# Patient Record
Sex: FEMALE | Race: WHITE | URBAN - METROPOLITAN AREA
[De-identification: names, ages, dates, MRNs, and addresses within clinical notes are randomized per-mention and may not be internally consistent; named-entity substitution may affect disease eponyms.]

---

## 2022-04-27 ENCOUNTER — OFFICE VISIT (OUTPATIENT)
Dept: URBAN - METROPOLITAN AREA SURGERY CENTER 31 | Facility: SURGERY CENTER | Age: 52
End: 2022-04-27
Payer: COMMERCIAL

## 2022-04-27 DIAGNOSIS — Z12.11 COLON CANCER SCREENING: ICD-10-CM

## 2022-04-27 PROCEDURE — G8907 PT DOC NO EVENTS ON DISCHARG: HCPCS | Performed by: STUDENT IN AN ORGANIZED HEALTH CARE EDUCATION/TRAINING PROGRAM

## 2022-04-27 PROCEDURE — G0121 COLON CA SCRN NOT HI RSK IND: HCPCS | Performed by: STUDENT IN AN ORGANIZED HEALTH CARE EDUCATION/TRAINING PROGRAM

## 2022-04-27 PROCEDURE — 45378 DIAGNOSTIC COLONOSCOPY: CPT | Performed by: STUDENT IN AN ORGANIZED HEALTH CARE EDUCATION/TRAINING PROGRAM

## 2023-04-26 ENCOUNTER — OFFICE VISIT (OUTPATIENT)
Dept: URBAN - METROPOLITAN AREA CLINIC 19 | Facility: CLINIC | Age: 53
End: 2023-04-26

## 2023-05-31 ENCOUNTER — DASHBOARD ENCOUNTERS (OUTPATIENT)
Age: 53
End: 2023-05-31

## 2023-05-31 ENCOUNTER — OFFICE VISIT (OUTPATIENT)
Dept: URBAN - METROPOLITAN AREA CLINIC 19 | Facility: CLINIC | Age: 53
End: 2023-05-31
Payer: COMMERCIAL

## 2023-05-31 ENCOUNTER — LAB OUTSIDE AN ENCOUNTER (OUTPATIENT)
Dept: URBAN - METROPOLITAN AREA CLINIC 19 | Facility: CLINIC | Age: 53
End: 2023-05-31

## 2023-05-31 ENCOUNTER — WEB ENCOUNTER (OUTPATIENT)
Dept: URBAN - METROPOLITAN AREA CLINIC 19 | Facility: CLINIC | Age: 53
End: 2023-05-31

## 2023-05-31 VITALS
SYSTOLIC BLOOD PRESSURE: 122 MMHG | DIASTOLIC BLOOD PRESSURE: 84 MMHG | HEIGHT: 64 IN | TEMPERATURE: 98.3 F | BODY MASS INDEX: 26.56 KG/M2 | WEIGHT: 155.6 LBS

## 2023-05-31 DIAGNOSIS — R74.8 ELEVATED ALKALINE PHOSPHATASE LEVEL: ICD-10-CM

## 2023-05-31 PROCEDURE — 99203 OFFICE O/P NEW LOW 30 MIN: CPT | Performed by: INTERNAL MEDICINE

## 2023-05-31 PROCEDURE — 99243 OFF/OP CNSLTJ NEW/EST LOW 30: CPT | Performed by: INTERNAL MEDICINE

## 2023-05-31 NOTE — HPI-TODAY'S VISIT:
Patient presents as a referral from Dr. Jemima Moraes for evaluation of elevated alkaline phsophatase. A copy of this note will be sent to the referring physician.  The patient has been getting routine labs through her PCP, and it was noted that her alkaline phosphatase has been elevated (less than 2X ULN). Isoenzymes have been normal - it is not clear what the source of the AP is. She has no symptoms except a "burning tongue" - her ENT cannot explain it. No pruritus, fatigue, or jaundice. The rest of her LFTs are normal. She had a screening colonoscopy last year - it was reportedly normal except for diverticulosis.

## 2023-06-07 LAB
A/G RATIO: 2.1
ACTIN (SMOOTH MUSCLE) ANTIBODY: 20
ALBUMIN: 4.4
ALKALINE PHOSPHATASE: 176
ALT (SGPT): 18
ANA DIRECT: NEGATIVE
AST (SGOT): 17
BILIRUBIN, TOTAL: 0.8
BUN/CREATININE RATIO: 19
BUN: 12
CALCIUM: 9.7
CARBON DIOXIDE, TOTAL: 21
CHLORIDE: 106
CREATININE: 0.64
EGFR: 105
GGT: 14
GLOBULIN, TOTAL: 2.1
GLUCOSE: 94
HEMATOCRIT: 48.4
HEMATOLOGY COMMENTS:: (no result)
HEMOGLOBIN: 16.4
IGG, SUBCLASS 4: 10
IMMUNOGLOBULIN G, QN, SERUM: 1022
LIVER-KIDNEY MICROSOMAL AB: 1.7
Lab: (no result)
MCH: 29.7
MCHC: 33.9
MCV: 88
MITOCHONDRIAL (M2) ANTIBODY: <20
NRBC: (no result)
PLATELETS: 128
POTASSIUM: 3.6
PROTEIN, TOTAL: 6.5
RBC: 5.52
RDW: 13
SODIUM: 144
WBC: 5.1

## 2023-06-15 ENCOUNTER — LAB OUTSIDE AN ENCOUNTER (OUTPATIENT)
Dept: URBAN - METROPOLITAN AREA CLINIC 19 | Facility: CLINIC | Age: 53
End: 2023-06-15